# Patient Record
Sex: FEMALE | URBAN - METROPOLITAN AREA
[De-identification: names, ages, dates, MRNs, and addresses within clinical notes are randomized per-mention and may not be internally consistent; named-entity substitution may affect disease eponyms.]

---

## 2022-04-05 ENCOUNTER — OFFICE VISIT (OUTPATIENT)
Dept: URBAN - METROPOLITAN AREA CLINIC 5 | Facility: CLINIC | Age: 72
End: 2022-04-05
Payer: MEDICARE

## 2022-04-05 DIAGNOSIS — H35.371 PUCKERING OF MACULA, RIGHT EYE: Primary | ICD-10-CM

## 2022-04-05 DIAGNOSIS — H40.1134 PRIMARY OPEN-ANGLE GLAUCOMA, INDETERMINATE, BILATERAL: ICD-10-CM

## 2022-04-05 DIAGNOSIS — H26.491 OTHER SECONDARY CATARACT, RIGHT EYE: ICD-10-CM

## 2022-04-05 DIAGNOSIS — H25.812 COMBINED FORMS OF AGE-RELATED CATARACT, LEFT EYE: ICD-10-CM

## 2022-04-05 PROCEDURE — 92250 FUNDUS PHOTOGRAPHY W/I&R: CPT | Performed by: OPTOMETRIST

## 2022-04-05 PROCEDURE — 99204 OFFICE O/P NEW MOD 45 MIN: CPT | Performed by: OPTOMETRIST

## 2022-04-05 RX ORDER — LATANOPROST 50 UG/ML
0.005 % SOLUTION OPHTHALMIC
Qty: 7.5 | Refills: 4 | Status: ACTIVE
Start: 2022-04-05

## 2022-04-05 ASSESSMENT — INTRAOCULAR PRESSURE
OS: 17
OD: 15

## 2022-04-05 NOTE — IMPRESSION/PLAN
Impression: Other secondary cataract, right eye: H26.491. Plan: Posterior capsular opacity -  (common clouding behind the intraocular lens implant) - the risks, benefits, and alternatives of YAG laser capsulotomy were discussed with the patient, who stated an understanding of the above, having all questions answered, and a desire to proceed with the procedure. Plan on YAG OD then OS.

## 2022-04-05 NOTE — IMPRESSION/PLAN
Impression: Primary open-angle glaucoma, indeterminate, bilateral: H40.1714. Plan: Continue latanoprost QHS OU. Baseline photo today. Plan on RNFL in the near future.

## 2022-04-05 NOTE — IMPRESSION/PLAN
Impression: Combined forms of age-related cataract, left eye: H25.812. Plan: Cataracts left eye - plan to perform cataract surgery left eye only: Discussed the risks, benefits, and alternatives of cataract surgery. Given that we almost never use retrobulbar anesthesia, there is typically no need to stop any anticoagulant medications when a patient gets cataract surgery with us. In most cataract surgeries performed by us we place an antibiotic and steroid at the time of surgery so most likely will not need to use any prescription post-op drops. The patient stated a full understanding and a desire to proceed with the procedure. Biometry ordered for intraocular lens selection. Advised against driving until complete. Reviewed the increased risk of retinal detachment after cataract surgery and reviewed retinal detachment and general warnings and to contact us immediately should any of those develop. Surgical recommendations: No contraindications for toric or MF IOL.

## 2022-05-06 ENCOUNTER — Encounter (OUTPATIENT)
Dept: URBAN - METROPOLITAN AREA SURGERY 2 | Facility: SURGERY | Age: 72
End: 2022-05-06
Payer: MEDICARE

## 2022-05-06 ENCOUNTER — LASER (OUTPATIENT)
Dept: URBAN - METROPOLITAN AREA SURGERY 1 | Facility: SURGERY | Age: 72
End: 2022-05-06
Payer: MEDICARE

## 2022-05-06 PROCEDURE — 66821 AFTER CATARACT LASER SURGERY: CPT | Performed by: OPHTHALMOLOGY

## 2022-05-20 ENCOUNTER — TESTING ONLY (OUTPATIENT)
Dept: URBAN - METROPOLITAN AREA CLINIC 6 | Facility: CLINIC | Age: 72
End: 2022-05-20
Payer: MEDICARE

## 2022-05-20 DIAGNOSIS — H25.812 COMBINED FORMS OF AGE-RELATED CATARACT, LEFT EYE: Primary | ICD-10-CM

## 2022-06-30 ENCOUNTER — PROCEDURE (OUTPATIENT)
Dept: URBAN - METROPOLITAN AREA SURGERY 1 | Facility: SURGERY | Age: 72
End: 2022-06-30
Payer: MEDICARE

## 2022-06-30 ENCOUNTER — Encounter (OUTPATIENT)
Dept: URBAN - METROPOLITAN AREA SURGERY 2 | Facility: SURGERY | Age: 72
End: 2022-06-30
Payer: MEDICARE

## 2022-06-30 PROCEDURE — 66984 XCAPSL CTRC RMVL W/O ECP: CPT | Performed by: OPHTHALMOLOGY

## 2022-07-01 ENCOUNTER — POST-OPERATIVE VISIT (OUTPATIENT)
Dept: URBAN - METROPOLITAN AREA CLINIC 6 | Facility: CLINIC | Age: 72
End: 2022-07-01
Payer: MEDICARE

## 2022-07-01 DIAGNOSIS — Z96.1 PRESENCE OF INTRAOCULAR LENS: Primary | ICD-10-CM

## 2022-07-01 PROCEDURE — 99024 POSTOP FOLLOW-UP VISIT: CPT | Performed by: OPTOMETRIST

## 2022-07-01 ASSESSMENT — INTRAOCULAR PRESSURE
OS: 19
OS: 32

## 2022-07-01 NOTE — IMPRESSION/PLAN
Impression: S/P Cataract Extraction by phacoemulsification with IOL placement OS - 1 Day. Presence of intraocular lens  Z96.1. Plan: S/P Cataract extraction left eye with placement of intraocular Dexycu and moxifloxacin intracamerally - post-op day #1 - OHTN OS at 32 mmHG easily lowered in office to 19 mmHG with Timolol and Brimonidine OS. Advised no heavy lifting or strenuous activity for at least one week and no swimming for at least three weeks. Use eye shield at night for one week. Patient advised to call immediately for any problems including, but not limited to, pain, redness, increase in floaters, flashing lights, changes in peripheral vision, decreased vision, and/or any other problems or concerns. Patient stated an understanding of all the instructions.

## 2022-07-19 ENCOUNTER — POST-OPERATIVE VISIT (OUTPATIENT)
Dept: URBAN - METROPOLITAN AREA CLINIC 5 | Facility: CLINIC | Age: 72
End: 2022-07-19
Payer: MEDICARE

## 2022-07-19 DIAGNOSIS — Z48.810 ENCOUNTER FOR SURGICAL AFTERCARE FOLLOWING SURGERY ON A SENSE ORGAN: Primary | ICD-10-CM

## 2022-07-19 PROCEDURE — 99024 POSTOP FOLLOW-UP VISIT: CPT | Performed by: OPTOMETRIST

## 2022-07-19 ASSESSMENT — INTRAOCULAR PRESSURE
OS: 20
OD: 16

## 2022-07-19 NOTE — IMPRESSION/PLAN
Impression: S/P CE/Standard IOL OS - 19 Days. Encounter for surgical aftercare following surgery on a sense organ  Z48.810. Plan: s/p cataract surgery left eye with placement of intraocular Dexycu or subconjunctival Kenalog and moxifloxacin intracamerally. Healing well. Advised patient to avoid swimming for at least 2 more weeks. Advised to call immediately for any problems or concerns including, but not limited to, pain, redness, changes in peripheral vision and/or decreased vision. The patient stated an understanding of the above. RTC in 3 months for dilated examination.

## 2022-09-19 ENCOUNTER — OFFICE VISIT (OUTPATIENT)
Dept: URBAN - METROPOLITAN AREA CLINIC 5 | Facility: CLINIC | Age: 72
End: 2022-09-19
Payer: MEDICARE

## 2022-09-19 DIAGNOSIS — Z48.810 ENCOUNTER FOR SURGICAL AFTERCARE FOLLOWING SURGERY ON A SENSE ORGAN: Primary | ICD-10-CM

## 2022-09-19 PROCEDURE — 99024 POSTOP FOLLOW-UP VISIT: CPT | Performed by: OPTOMETRIST

## 2022-09-19 RX ORDER — PREDNISOLONE ACETATE 10 MG/ML
1 % SUSPENSION/ DROPS OPHTHALMIC
Qty: 10 | Refills: 0 | Status: ACTIVE
Start: 2022-09-19

## 2022-09-19 ASSESSMENT — INTRAOCULAR PRESSURE
OS: 27
OD: 17

## 2022-09-19 NOTE — IMPRESSION/PLAN
Impression: Encounter for surgical aftercare following surgery on a sense organ: Z48.810 OS. Plan: Breakthrough post-op iritis left eye - Start prednisolone acetate QID OU (shake very well). Gently close eyes for five minutes after instillation of drops. Return immediately for increased pain, increased redness, or decreased vision.

## 2022-10-03 ENCOUNTER — OFFICE VISIT (OUTPATIENT)
Dept: URBAN - METROPOLITAN AREA CLINIC 5 | Facility: CLINIC | Age: 72
End: 2022-10-03
Payer: MEDICARE

## 2022-10-03 DIAGNOSIS — Z48.810 ENCOUNTER FOR SURGICAL AFTERCARE FOLLOWING SURGERY ON A SENSE ORGAN: Primary | ICD-10-CM

## 2022-10-03 PROCEDURE — 99024 POSTOP FOLLOW-UP VISIT: CPT | Performed by: OPTOMETRIST

## 2022-10-03 RX ORDER — DORZOLAMIDE HYDROCHLORIDE AND TIMOLOL MALEATE 20; 5 MG/ML; MG/ML
SOLUTION/ DROPS OPHTHALMIC
Qty: 5 | Refills: 0 | Status: ACTIVE
Start: 2022-10-03

## 2022-10-03 ASSESSMENT — INTRAOCULAR PRESSURE
OS: 30
OD: 15

## 2022-10-03 NOTE — IMPRESSION/PLAN
Impression: Encounter for surgical aftercare following surgery on a sense organ: Z48.810 OS. Plan: Breakthrough post-op iritis left eye - Continue prednisolone acetate QID OS (shake very well) and start Cosopt BID OS only. Gently close eyes for five minutes after instillation of drops. Return immediately for increased pain, increased redness, or decreased vision.

## 2022-10-11 ENCOUNTER — POST-OPERATIVE VISIT (OUTPATIENT)
Dept: URBAN - METROPOLITAN AREA CLINIC 5 | Facility: CLINIC | Age: 72
End: 2022-10-11
Payer: MEDICARE

## 2022-10-11 DIAGNOSIS — Z48.810 ENCOUNTER FOR SURGICAL AFTERCARE FOLLOWING SURGERY ON A SENSE ORGAN: Primary | ICD-10-CM

## 2022-10-11 PROCEDURE — 99024 POSTOP FOLLOW-UP VISIT: CPT | Performed by: OPTOMETRIST

## 2022-10-11 ASSESSMENT — INTRAOCULAR PRESSURE
OS: 15
OD: 8

## 2022-10-11 NOTE — IMPRESSION/PLAN
Impression: S/P CE/Standard IOL OS - 103 Days. Encounter for surgical aftercare following surgery on a sense organ  Z48.810. Plan: s/p YAG OD and CE OS. Rebound inflammation resolving. Use Cosopt BID OS and Pred BID OS. Plan on dilated examination in 6 weeks.

## 2022-11-22 ENCOUNTER — OFFICE VISIT (OUTPATIENT)
Dept: URBAN - METROPOLITAN AREA CLINIC 5 | Facility: CLINIC | Age: 72
End: 2022-11-22
Payer: MEDICARE

## 2022-11-22 DIAGNOSIS — H40.1134 PRIMARY OPEN-ANGLE GLAUCOMA, INDETERMINATE, BILATERAL: ICD-10-CM

## 2022-11-22 DIAGNOSIS — H35.371 PUCKERING OF MACULA, RIGHT EYE: ICD-10-CM

## 2022-11-22 DIAGNOSIS — H26.492 OTHER SECONDARY CATARACT, LEFT EYE: Primary | ICD-10-CM

## 2022-11-22 PROCEDURE — 99214 OFFICE O/P EST MOD 30 MIN: CPT | Performed by: OPTOMETRIST

## 2022-11-22 PROCEDURE — 92250 FUNDUS PHOTOGRAPHY W/I&R: CPT | Performed by: OPTOMETRIST

## 2022-11-22 RX ORDER — DORZOLAMIDE HYDROCHLORIDE AND TIMOLOL MALEATE 20; 5 MG/ML; MG/ML
SOLUTION/ DROPS OPHTHALMIC
Qty: 5 | Refills: 3 | Status: INACTIVE
Start: 2022-11-22 | End: 2022-12-21

## 2022-11-22 ASSESSMENT — INTRAOCULAR PRESSURE
OS: 17
OD: 16

## 2022-11-22 NOTE — IMPRESSION/PLAN
Impression: Other secondary cataract, left eye: H26.492. Plan: Posterior capsular opacity -  (common clouding behind the intraocular lens implant) - the risks, benefits, and alternatives of YAG laser capsulotomy were discussed with the patient, who stated an understanding of the above, having all questions answered, and a desire to proceed with the procedure. Plan on YAG OS only.

## 2022-11-22 NOTE — IMPRESSION/PLAN
Impression: Primary open-angle glaucoma, indeterminate, bilateral: H40.4701. Plan: Glaucoma -Continue Cosopt BID OS and Latanoprost QHS OU. Fundus photos ordered today show no change from previous. The pathophysiology of glaucoma and the difference between having glaucoma and being a glaucoma suspect were reviewed with the patient. All of the patients questions were answered and they stated they understand their finding. HVF 24-2 and RNFL were ordered for next visit.

## 2023-01-24 ENCOUNTER — OFFICE VISIT (OUTPATIENT)
Dept: URBAN - METROPOLITAN AREA CLINIC 5 | Facility: CLINIC | Age: 73
End: 2023-01-24
Payer: MEDICARE

## 2023-01-24 DIAGNOSIS — H35.352 CYSTOID MACULAR DEGENERATION, LEFT EYE: ICD-10-CM

## 2023-01-24 DIAGNOSIS — H26.492 OTHER SECONDARY CATARACT, LEFT EYE: ICD-10-CM

## 2023-01-24 DIAGNOSIS — H40.1134 PRIMARY OPEN-ANGLE GLAUCOMA, BILATERAL, INDETERMINATE STAGE: Primary | ICD-10-CM

## 2023-01-24 PROCEDURE — 92133 CPTRZD OPH DX IMG PST SGM ON: CPT | Performed by: OPTOMETRIST

## 2023-01-24 PROCEDURE — 92012 INTRM OPH EXAM EST PATIENT: CPT | Performed by: OPTOMETRIST

## 2023-01-24 PROCEDURE — 92083 EXTENDED VISUAL FIELD XM: CPT | Performed by: OPTOMETRIST

## 2023-01-24 PROCEDURE — 76514 ECHO EXAM OF EYE THICKNESS: CPT | Performed by: OPTOMETRIST

## 2023-01-24 RX ORDER — KETOROLAC TROMETHAMINE 5 MG/ML
0.5 % SOLUTION OPHTHALMIC
Qty: 5 | Refills: 0 | Status: ACTIVE
Start: 2023-01-24

## 2023-01-24 ASSESSMENT — INTRAOCULAR PRESSURE
OS: 14
OD: 13

## 2023-01-24 NOTE — IMPRESSION/PLAN
Impression: Primary open-angle glaucoma, bilateral, indeterminate stage: H40.1134. Plan: Glaucoma  OD > OS - RNFL OCT scan ordered today shows average thicknesses of  74/96  which has thin quadrants OD which match RNFL; OS has peripheral defects which do not appear to match RNFL. Humphery Visual Field 24-2 ordered today showed superior and inferior arcuate defects OU with OD  mean deviation -15.81 and OS -14.72. Intraocular pressure is normal and pachymetry revealed thinner than normal corneal thicknesses. The patients questions were answered and stated they understood the findings and need for regular monitoring. Stop Latanoprost and Cosopt and will recheck pressure 2 weeks after YAG.

## 2023-01-24 NOTE — IMPRESSION/PLAN
Impression: Cystoid macular degeneration, left eye: H35.352. Plan: Start ketorolac TID OS. Plan to recheck MOCT after YAG OS.

## 2023-02-15 ENCOUNTER — OFFICE VISIT (OUTPATIENT)
Dept: URBAN - METROPOLITAN AREA CLINIC 5 | Facility: CLINIC | Age: 73
End: 2023-02-15
Payer: MEDICARE

## 2023-02-15 DIAGNOSIS — H40.1134 PRIMARY OPEN-ANGLE GLAUCOMA, BILATERAL, INDETERMINATE STAGE: ICD-10-CM

## 2023-02-15 DIAGNOSIS — H35.352 CYSTOID MACULAR DEGENERATION, LEFT EYE: Primary | ICD-10-CM

## 2023-02-15 PROCEDURE — 92134 CPTRZ OPH DX IMG PST SGM RTA: CPT | Performed by: OPTOMETRIST

## 2023-02-15 PROCEDURE — 92012 INTRM OPH EXAM EST PATIENT: CPT | Performed by: OPTOMETRIST

## 2023-02-15 ASSESSMENT — INTRAOCULAR PRESSURE
OD: 14
OS: 21

## 2023-02-15 NOTE — IMPRESSION/PLAN
Impression: Primary open-angle glaucoma, bilateral, indeterminate stage: H40.1134. Plan: Glaucoma  OD > OS - IOP stable OD , borderline OS. Continue without IOP drops for now.  (Latanoprost and Cosopot)

## 2023-02-15 NOTE — IMPRESSION/PLAN
Impression: Cystoid macular degeneration, left eye: H35.352. Plan: Repeat MOCT shows improvement. Continue with ketorolac TID OS until runs out.

## 2023-06-23 ENCOUNTER — OFFICE VISIT (OUTPATIENT)
Dept: URBAN - METROPOLITAN AREA CLINIC 5 | Facility: CLINIC | Age: 73
End: 2023-06-23
Payer: MEDICARE

## 2023-06-23 DIAGNOSIS — H40.1134 PRIMARY OPEN-ANGLE GLAUCOMA, BILATERAL, INDETERMINATE STAGE: Primary | ICD-10-CM

## 2023-06-23 DIAGNOSIS — H04.123 TEAR FILM INSUFFICIENCY OF BILATERAL LACRIMAL GLANDS: ICD-10-CM

## 2023-06-23 DIAGNOSIS — H35.352 CYSTOID MACULAR DEGENERATION, LEFT EYE: ICD-10-CM

## 2023-06-23 PROCEDURE — 92133 CPTRZD OPH DX IMG PST SGM ON: CPT | Performed by: OPTOMETRIST

## 2023-06-23 PROCEDURE — 92012 INTRM OPH EXAM EST PATIENT: CPT | Performed by: OPTOMETRIST

## 2023-06-23 PROCEDURE — 76514 ECHO EXAM OF EYE THICKNESS: CPT | Performed by: OPTOMETRIST

## 2023-06-23 PROCEDURE — 92083 EXTENDED VISUAL FIELD XM: CPT | Performed by: OPTOMETRIST

## 2023-06-23 ASSESSMENT — INTRAOCULAR PRESSURE
OD: 10
OS: 10

## 2023-06-23 NOTE — IMPRESSION/PLAN
Impression: Cystoid macular degeneration, left eye: H35.352. Plan: Repeat MOCT shows improvement. No longer needs ketorolac.

## 2023-06-23 NOTE — IMPRESSION/PLAN
Impression: Primary open-angle glaucoma, bilateral, indeterminate stage: H40.1134. Plan: Glaucoma  OD > OS - IOP stable OU. RNFL stable from initial scan 74/92. Continue without IOP drops for now. HVF 24-2 shows nasal defects OD (MD -7.62) and full visual field OS. Thinner than normal pachymetry. Dr. Hail Kruse discussed with the patient that the glaucomatous damage is likely historic damage OD prior to cataract surgery, and with her current pressures, no treatment is necessary at this time.

## 2024-01-02 ENCOUNTER — OFFICE VISIT (OUTPATIENT)
Dept: URBAN - METROPOLITAN AREA CLINIC 5 | Facility: CLINIC | Age: 74
End: 2024-01-02
Payer: MEDICARE

## 2024-01-02 DIAGNOSIS — H52.223 REGULAR ASTIGMATISM, BILATERAL: ICD-10-CM

## 2024-01-02 DIAGNOSIS — H35.373 PUCKERING OF MACULA, BILATERAL: Primary | ICD-10-CM

## 2024-01-02 DIAGNOSIS — H40.1134 PRIMARY OPEN-ANGLE GLAUCOMA, BILATERAL, INDETERMINATE STAGE: ICD-10-CM

## 2024-01-02 PROCEDURE — 92250 FUNDUS PHOTOGRAPHY W/I&R: CPT | Performed by: OPTOMETRIST

## 2024-01-02 PROCEDURE — 92014 COMPRE OPH EXAM EST PT 1/>: CPT | Performed by: OPTOMETRIST

## 2024-01-02 ASSESSMENT — VISUAL ACUITY
OD: 20/25
OS: 20/30

## 2024-01-02 ASSESSMENT — INTRAOCULAR PRESSURE
OS: 15
OD: 14

## 2025-01-28 ENCOUNTER — OFFICE VISIT (OUTPATIENT)
Dept: URBAN - METROPOLITAN AREA CLINIC 5 | Facility: CLINIC | Age: 75
End: 2025-01-28
Payer: COMMERCIAL

## 2025-01-28 DIAGNOSIS — H10.45 OTHER CHRONIC ALLERGIC CONJUNCTIVITIS: Primary | ICD-10-CM

## 2025-01-28 DIAGNOSIS — H40.1134 PRIMARY OPEN-ANGLE GLAUCOMA, INDETERMINATE, BILATERAL: ICD-10-CM

## 2025-01-28 PROCEDURE — 99213 OFFICE O/P EST LOW 20 MIN: CPT | Performed by: OPTOMETRIST

## 2025-01-28 RX ORDER — FLUOROMETHOLONE 1 MG/ML
0.1 % SUSPENSION/ DROPS OPHTHALMIC
Qty: 10 | Refills: 0 | Status: ACTIVE
Start: 2025-01-28

## 2025-01-28 ASSESSMENT — INTRAOCULAR PRESSURE
OS: 15
OD: 15

## 2025-02-27 ENCOUNTER — OFFICE VISIT (OUTPATIENT)
Dept: URBAN - METROPOLITAN AREA CLINIC 5 | Facility: CLINIC | Age: 75
End: 2025-02-27
Payer: COMMERCIAL

## 2025-02-27 DIAGNOSIS — H10.45 OTHER CHRONIC ALLERGIC CONJUNCTIVITIS: ICD-10-CM

## 2025-02-27 DIAGNOSIS — H40.1134 PRIMARY OPEN-ANGLE GLAUCOMA, BILATERAL, INDETERMINATE STAGE: ICD-10-CM

## 2025-02-27 DIAGNOSIS — H35.373 PUCKERING OF MACULA, BILATERAL: Primary | ICD-10-CM

## 2025-02-27 PROCEDURE — 92014 COMPRE OPH EXAM EST PT 1/>: CPT | Performed by: OPTOMETRIST

## 2025-02-27 PROCEDURE — 92250 FUNDUS PHOTOGRAPHY W/I&R: CPT | Performed by: OPTOMETRIST

## 2025-02-27 ASSESSMENT — INTRAOCULAR PRESSURE
OD: 17
OS: 17

## 2025-08-28 ENCOUNTER — OFFICE VISIT (OUTPATIENT)
Dept: URBAN - METROPOLITAN AREA CLINIC 5 | Facility: CLINIC | Age: 75
End: 2025-08-28
Payer: COMMERCIAL

## 2025-08-28 DIAGNOSIS — H11.041 PERIPHERAL PTERYGIUM, STATIONARY, RIGHT EYE: ICD-10-CM

## 2025-08-28 DIAGNOSIS — H40.1134 PRIMARY OPEN-ANGLE GLAUCOMA, BILATERAL, INDETERMINATE STAGE: Primary | ICD-10-CM

## 2025-08-28 PROCEDURE — 92083 EXTENDED VISUAL FIELD XM: CPT | Performed by: OPTOMETRIST

## 2025-08-28 PROCEDURE — 92133 CPTRZD OPH DX IMG PST SGM ON: CPT | Performed by: OPTOMETRIST

## 2025-08-28 PROCEDURE — 99213 OFFICE O/P EST LOW 20 MIN: CPT | Performed by: OPTOMETRIST

## 2025-08-28 ASSESSMENT — INTRAOCULAR PRESSURE
OD: 17
OS: 15